# Patient Record
Sex: FEMALE | RURAL
[De-identification: names, ages, dates, MRNs, and addresses within clinical notes are randomized per-mention and may not be internally consistent; named-entity substitution may affect disease eponyms.]

---

## 2021-06-03 VITALS — HEIGHT: 61 IN

## 2021-06-03 RX ORDER — CETIRIZINE HYDROCHLORIDE 10 MG/1
10 TABLET ORAL DAILY
COMMUNITY

## 2021-06-03 RX ORDER — FUROSEMIDE 20 MG/1
20 TABLET ORAL AS NEEDED
COMMUNITY

## 2021-06-03 RX ORDER — HYDROCODONE BITARTRATE AND ACETAMINOPHEN 5; 325 MG/1; MG/1
1 TABLET ORAL EVERY 6 HOURS PRN
COMMUNITY
End: 2021-06-04 | Stop reason: ALTCHOICE

## 2021-06-03 RX ORDER — CHOLECALCIFEROL (VITAMIN D3) 125 MCG
CAPSULE ORAL
COMMUNITY
End: 2021-12-01 | Stop reason: ALTCHOICE

## 2021-06-04 ENCOUNTER — OFFICE VISIT (OUTPATIENT)
Dept: CARDIOLOGY | Facility: CLINIC | Age: 86
End: 2021-06-04

## 2021-06-04 VITALS
WEIGHT: 110 LBS | SYSTOLIC BLOOD PRESSURE: 140 MMHG | HEIGHT: 61 IN | BODY MASS INDEX: 20.77 KG/M2 | DIASTOLIC BLOOD PRESSURE: 72 MMHG | HEART RATE: 73 BPM

## 2021-06-04 DIAGNOSIS — Q24.0 DEXTROCARDIA: ICD-10-CM

## 2021-06-04 DIAGNOSIS — I48.92 ATRIAL FLUTTER, UNSPECIFIED TYPE (HCC): Primary | ICD-10-CM

## 2021-06-04 PROCEDURE — 93000 ELECTROCARDIOGRAM COMPLETE: CPT | Performed by: INTERNAL MEDICINE

## 2021-06-04 PROCEDURE — 99204 OFFICE O/P NEW MOD 45 MIN: CPT | Performed by: INTERNAL MEDICINE

## 2021-06-04 RX ORDER — SODIUM PHOSPHATE,MONO-DIBASIC 19G-7G/118
1 ENEMA (ML) RECTAL
COMMUNITY

## 2021-06-04 RX ORDER — UBIDECARENONE 50 MG
1 CAPSULE ORAL DAILY
COMMUNITY

## 2021-06-04 RX ORDER — TURMERIC ROOT EXTRACT 500 MG
1 TABLET ORAL DAILY
COMMUNITY

## 2021-06-04 NOTE — PROGRESS NOTES
Subjective:     Encounter Date:06/04/2021      Patient ID: Maria Guadalupe Cook is a 87 y.o. female.    Chief Complaint:  History of Present Illness    This is an 87-year-old with a history of dextrocardia, chronic atrial flutter, who presents to [facility].      The patient reports that she was told about her dextrocardia in her 20s.  It is unclear when she was diagnosed with her atrial fibrillation/flutter.  She reports that she was started on anticoagulation about 3 years ago.  It appears that she was admitted to the hospital in 10/2020 with atrial flutter with rapid ventricular rates.  She was started on diltiazem at that time and continued on apixaban.  A Holter monitor was placed which showed that her average heart rate was 93 with a minimum heart rate of 51 and a maximum heart rate of 147.  She subsequently developed issues with bradycardia and her diltiazem was discontinued with improvement in her heart rates.  It also appears that she had a stress test performed in 2/2019 following an episode of syncope which showed evidence of lateral and apical fixed defect but no ischemia.  Patient was seen most recently by Dr. Lewis for routine follow-up at which time they did discuss the possibility of pacemaker placement if she develops any issues with tachybradycardia syndrome.  The patient indicated at that time that she wanted to be as conservative as possible with her care.    Overall she reports that she has been feeling well.  She reports chronic dyspnea on exertion which is stable and unchanged.  She reports occasional palpitations but nothing that significantly limits her or causes significant symptoms.  She denies any orthopnea, chest pain, or worsening of her chronic left lower extremity edema.  She reports lightheadedness with position changes which is chronic and unchanged.  She reports that she tries to walk about a half a mile daily but is mainly limited by her knees.    Review of Systems    Constitutional: Negative for malaise/fatigue.   HENT: Negative for hearing loss, hoarse voice, nosebleeds and sore throat.    Eyes: Negative for pain.   Cardiovascular: Positive for dyspnea on exertion and palpitations. Negative for chest pain, claudication, cyanosis, irregular heartbeat, leg swelling, near-syncope, orthopnea, paroxysmal nocturnal dyspnea and syncope.   Respiratory: Negative for shortness of breath and snoring.    Endocrine: Negative for cold intolerance, heat intolerance, polydipsia, polyphagia and polyuria.   Skin: Negative for itching and rash.   Musculoskeletal: Negative for arthritis, falls, joint pain, joint swelling, muscle cramps, muscle weakness and myalgias.   Gastrointestinal: Negative for constipation, diarrhea, dysphagia, heartburn, hematemesis, hematochezia, melena, nausea and vomiting.   Genitourinary: Negative for frequency, hematuria and hesitancy.   Neurological: Negative for excessive daytime sleepiness, dizziness, headaches, light-headedness, numbness and weakness.   Psychiatric/Behavioral: Negative for depression. The patient is not nervous/anxious.          Current Outpatient Medications:   •  apixaban (ELIQUIS) 2.5 MG tablet tablet, Take 2.5 mg by mouth 2 (Two) Times a Day., Disp: , Rfl:   •  cetirizine (zyrTEC) 10 MG tablet, Take 10 mg by mouth Daily., Disp: , Rfl:   •  Cholecalciferol (D3 High Potency) 50 MCG (2000 UT) capsule, Take  by mouth., Disp: , Rfl:   •  Coenzyme Q10 400 MG capsule, Take  by mouth., Disp: , Rfl:   •  furosemide (LASIX) 20 MG tablet, Take 20 mg by mouth As Needed., Disp: , Rfl:   •  Garlic 100 MG tablet, Take 1 tablet by mouth Daily., Disp: , Rfl:   •  glucosamine-chondroitin 500-400 MG capsule capsule, Take 1 capsule by mouth 3 (Three) Times a Day With Meals., Disp: , Rfl:   •  Red Yeast Rice 600 MG tablet, Take 1 tablet by mouth Daily., Disp: , Rfl:   •  Turmeric 500 MG tablet, Take 1 tablet by mouth Daily., Disp: , Rfl:     Past Medical History:  "  Diagnosis Date   • A-fib (CMS/HCC)    • Acute diastolic CHF (congestive heart failure) (CMS/HCC)    • Arthritis    • Atrial flutter (CMS/HCC)    • Chest pain    • Dextrocardia    • Hypertension    • Syncope        Past Surgical History:   Procedure Laterality Date   • HYSTERECTOMY     • JOINT REPLACEMENT     • RECTAL PROLAPSE REPAIR         Family History   Problem Relation Age of Onset   • Diabetes Sister    • Cancer Sister    • Stroke Mother    • Cancer Father        Social History     Tobacco Use   • Smoking status: Former Smoker     Quit date:      Years since quittin.4   • Smokeless tobacco: Never Used   Vaping Use   • Vaping Use: Never used   Substance Use Topics   • Alcohol use: Not Currently   • Drug use: Defer         ECG 12 Lead    Date/Time: 2021 12:50 PM  Performed by: Shayla Hamilton MD  Authorized by: Shayla Hamilton MD   Comparison: compared with previous ECG   Similar to previous ECG  Rhythm: atrial flutter  Conduction: 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 Well-developed.   Eyes:      General: Lids are normal.      Conjunctiva/sclera: Conjunctivae normal.      Pupils: Pupils are equal, round, and reactive to light.   HENT:      Head: Normocephalic and atraumatic.   Neck:      Vascular: No carotid bruit or JVD.      Lymphadenopathy: No cervical adenopathy.   Pulmonary:      Effort: Pulmonary effort is normal.      Breath sounds: Normal breath sounds.   Cardiovascular:      Normal rate. Irregularly irregular rhythm.      No gallop.   Pulses:     Radial: 2+ bilaterally.  Edema:     Peripheral edema present.     Ankle: 1+ edema of the left ankle.  Abdominal:      Palpations: Abdomen is soft.   Musculoskeletal:      Cervical back: Full passive range of motion without pain, " normal range of motion and neck supple. Skin:     General: Skin is warm and dry.   Neurological:      Mental Status: Alert and oriented to person, place, and time.             Assessment:          Diagnosis Plan   1. Atrial flutter, unspecified type (CMS/HCC)     2. Dextrocardia            Plan:       1.  Chronic atrial flutter.  Rate controlled despite being off of any AV roopa blocking agents.  She is on chronic anticoagulation with apixaban.  Continue the same.  2.  Possible sick sinus syndrome.  No indication for pacemaker placement at this time.  3.  Dextrocardia    I will plan on seeing the patient back again in 6 months.

## 2021-08-16 ENCOUNTER — TELEPHONE (OUTPATIENT)
Dept: CARDIOLOGY | Facility: CLINIC | Age: 86
End: 2021-08-16

## 2021-08-16 NOTE — TELEPHONE ENCOUNTER
"Dr. Hamilton,    Ms. Maravilla called to report \"chest pain\" over the weekend. This is resolved now. She states that she had this intermittently starting on Friday.She noticed it mostly at night because it wakes her up. She describes the pain as Left sided (she has dextracardia) and it just feels like her heart stops. I asked her about palpitations and she said that is what it feels like but  there was some heaviness with this but didn't last.    She is requesting to see you but she is in [place] and you won't be there until next week. She can't come to [place].    She states she only takes some vitamins and eliquis 2.5mg BID.    Please advise.    Thank you,  Katie Nieves RN  [facility]  Triage    "

## 2021-08-16 NOTE — TELEPHONE ENCOUNTER
Sounds like she may be having ectopy (PVCs?).  Work her in with me for next week when I am next in [place].  Only other option is to see if Cheri could see her this Thursday.   If symptoms worsen she will need to go to emergency room.

## 2021-08-16 NOTE — TELEPHONE ENCOUNTER
Patient scheduled and instructed to go to the ED for worsening symptoms.    Thank you,  Katie Nieves RN  [facility]  Triage

## 2021-12-01 PROBLEM — I49.3 PVC'S (PREMATURE VENTRICULAR CONTRACTIONS): Status: ACTIVE | Noted: 2021-08-25

## 2021-12-01 PROBLEM — I48.0 PAF (PAROXYSMAL ATRIAL FIBRILLATION) (HCC): Status: ACTIVE | Noted: 2021-06-04

## 2021-12-01 PROBLEM — I10 ESSENTIAL HYPERTENSION: Status: ACTIVE | Noted: 2021-08-25

## 2021-12-01 PROBLEM — Z95.0 PRESENCE OF PERMANENT CARDIAC PACEMAKER: Status: ACTIVE | Noted: 2021-08-26

## 2021-12-01 PROBLEM — I49.5 SSS (SICK SINUS SYNDROME) (HCC): Status: ACTIVE | Noted: 2021-08-25

## 2021-12-01 PROBLEM — R00.1 BRADYCARDIA: Status: ACTIVE | Noted: 2021-08-25

## 2021-12-01 RX ORDER — METOPROLOL SUCCINATE 25 MG/1
25 TABLET, EXTENDED RELEASE ORAL DAILY
COMMUNITY
Start: 2021-08-27

## 2021-12-01 RX ORDER — LISINOPRIL 5 MG/1
5 TABLET ORAL DAILY
COMMUNITY
Start: 2021-08-27

## 2021-12-01 RX ORDER — MELATONIN
2000 DAILY
COMMUNITY

## 2021-12-01 RX ORDER — MULTIPLE VITAMINS W/ MINERALS TAB 9MG-400MCG
1 TAB ORAL DAILY
COMMUNITY